# Patient Record
Sex: FEMALE | Race: WHITE | Employment: PART TIME | URBAN - METROPOLITAN AREA
[De-identification: names, ages, dates, MRNs, and addresses within clinical notes are randomized per-mention and may not be internally consistent; named-entity substitution may affect disease eponyms.]

---

## 2021-10-16 ENCOUNTER — HOSPITAL ENCOUNTER (EMERGENCY)
Age: 46
Discharge: HOME | End: 2021-10-16
Attending: STUDENT IN AN ORGANIZED HEALTH CARE EDUCATION/TRAINING PROGRAM
Payer: COMMERCIAL

## 2021-10-16 VITALS
TEMPERATURE: 98.8 F | RESPIRATION RATE: 16 BRPM | HEART RATE: 104 BPM | DIASTOLIC BLOOD PRESSURE: 86 MMHG | BODY MASS INDEX: 22.53 KG/M2 | OXYGEN SATURATION: 98 % | HEIGHT: 66 IN | SYSTOLIC BLOOD PRESSURE: 128 MMHG | WEIGHT: 140.21 LBS

## 2021-10-16 DIAGNOSIS — K04.7 DENTAL INFECTION: Primary | ICD-10-CM

## 2021-10-16 PROCEDURE — 74011250636 HC RX REV CODE- 250/636: Performed by: STUDENT IN AN ORGANIZED HEALTH CARE EDUCATION/TRAINING PROGRAM

## 2021-10-16 PROCEDURE — 75810000283 HC INJECTION NERVE BLOCK

## 2021-10-16 PROCEDURE — 74011000250 HC RX REV CODE- 250: Performed by: STUDENT IN AN ORGANIZED HEALTH CARE EDUCATION/TRAINING PROGRAM

## 2021-10-16 PROCEDURE — 99283 EMERGENCY DEPT VISIT LOW MDM: CPT

## 2021-10-16 PROCEDURE — 96372 THER/PROPH/DIAG INJ SC/IM: CPT

## 2021-10-16 PROCEDURE — 74011250637 HC RX REV CODE- 250/637: Performed by: STUDENT IN AN ORGANIZED HEALTH CARE EDUCATION/TRAINING PROGRAM

## 2021-10-16 RX ORDER — LIDOCAINE HYDROCHLORIDE 10 MG/ML
5 INJECTION INFILTRATION; PERINEURAL ONCE
Status: COMPLETED | OUTPATIENT
Start: 2021-10-16 | End: 2021-10-16

## 2021-10-16 RX ORDER — BUPIVACAINE HYDROCHLORIDE 5 MG/ML
1 INJECTION, SOLUTION EPIDURAL; INTRACAUDAL
Status: COMPLETED | OUTPATIENT
Start: 2021-10-16 | End: 2021-10-16

## 2021-10-16 RX ORDER — KETOROLAC TROMETHAMINE 30 MG/ML
30 INJECTION, SOLUTION INTRAMUSCULAR; INTRAVENOUS
Status: COMPLETED | OUTPATIENT
Start: 2021-10-16 | End: 2021-10-16

## 2021-10-16 RX ADMIN — KETOROLAC TROMETHAMINE 30 MG: 30 INJECTION, SOLUTION INTRAMUSCULAR; INTRAVENOUS at 16:17

## 2021-10-16 RX ADMIN — BUPIVACAINE HYDROCHLORIDE 5 MG: 5 INJECTION, SOLUTION EPIDURAL; INTRACAUDAL; PERINEURAL at 16:17

## 2021-10-16 RX ADMIN — LIDOCAINE HYDROCHLORIDE: 20 SOLUTION ORAL; TOPICAL at 16:17

## 2021-10-16 RX ADMIN — LIDOCAINE HYDROCHLORIDE 5 ML: 10 INJECTION, SOLUTION INFILTRATION; PERINEURAL at 16:17

## 2021-10-16 NOTE — ED PROVIDER NOTES
The patient is a 43-year-old female history of hepatitis C presenting today secondary to dental pain. She reports that she has had severe pain in the right lower molar region. She has a tooth that needs to be pulled and she has an appointment next week for this. She had been seen at the dental clinic a month ago and was told to take clindamycin but does started taking it today. She is taking over-the-counter pain medications. Patient is requesting a dental block just that she can get to the rest of her night. No trouble swallowing or breathing. No fevers. Past Medical History:   Diagnosis Date    Hepatitis C     IBS (irritable bowel syndrome)        Past Surgical History:   Procedure Laterality Date    HX  SECTION      HX CHOLECYSTECTOMY  2009         No family history on file. Social History     Socioeconomic History    Marital status:      Spouse name: Not on file    Number of children: Not on file    Years of education: Not on file    Highest education level: Not on file   Occupational History    Not on file   Tobacco Use    Smoking status: Not on file   Substance and Sexual Activity    Alcohol use: Not on file    Drug use: Not on file    Sexual activity: Not on file   Other Topics Concern    Not on file   Social History Narrative    Not on file     Social Determinants of Health     Financial Resource Strain:     Difficulty of Paying Living Expenses:    Food Insecurity:     Worried About Running Out of Food in the Last Year:     [address] in the Last Year:    Transportation Needs:     Lack of Transportation (Medical):      Lack of Transportation (Non-Medical):    Physical Activity:     Days of Exercise per Week:     Minutes of Exercise per Session:    Stress:     Feeling of Stress :    Social Connections:     Frequency of Communication with Friends and Family:     Frequency of Social Gatherings with Friends and Family:     Attends [employer] Services:     Active Member of Clubs or Organizations:     Attends Club or Organization Meetings:     Marital Status:    Intimate Partner Violence:     Fear of Current or Ex-Partner:     Emotionally Abused:     Physically Abused:     Sexually Abused: ALLERGIES: Augmentin [amoxicillin-pot clavulanate], Gluten, Milk containing products, and Potato starch    Review of Systems   Constitutional: Positive for fever. HENT: Positive for dental problem. Negative for trouble swallowing. Vitals:    10/16/21 1458   BP: 128/86   Pulse: (!) 104   Resp: 16   Temp: 98.8 °F (37.1 °C)   SpO2: 98%   Weight: 63.6 kg (140 lb 3.4 oz)   Height: 5' 5.5\" (1.664 m)            Physical Exam  Constitutional:       General: She is not in acute distress. Appearance: She is well-developed. HENT:      Head: Normocephalic. Mouth/Throat:      Comments: Overall poor dentition  Significant erosion to tooth #32 with mild erythema and inflammation adjacent to the tooth, no periapical abscess. Floor the mouth is soft. Uvula is midline. No trismus or muffled voice  Eyes:      Conjunctiva/sclera: Conjunctivae normal.   Pulmonary:      Effort: Pulmonary effort is normal. No respiratory distress. Musculoskeletal:         General: Normal range of motion. Cervical back: Normal range of motion. Skin:     General: Skin is warm and dry. Capillary Refill: Capillary refill takes less than 2 seconds. Psychiatric:         Behavior: Behavior normal.              Course:  Dental balls provided    Procedure Note - Dental Block:    Performed by Mercedes Fatima DO . Immediately prior to the procedure, the patient was reevaluated and found suitable for the planned procedure and any planned medications. Immediately prior to the procedure a time out was called to verify the correct patient, procedure, equipment, staff, and marking as appropriate.     To achieve control of dental pain, a supraperiosteal infiltration was performed on the right side. 1 mL of 0.5% bupivicaine and 1% lidocaine was injected without complication. The procedure was tolerated well. MDM: Patient is a 29-year-old female presenting today with a dental infection on the right side. She just started taking her clindamycin today. She is requesting a dental block which was performed above with improvement in symptoms. She was given dental balls in the emergency department. She does not want any other pain medications. She has follow-up scheduled next week for extraction of this tooth. Clinical Impression:     ICD-10-CM ICD-9-CM    1.  Dental infection  K04.7 522.4            Disposition: CARMEN Donald,

## 2021-10-16 NOTE — ED NOTES
Dr. Bryanna Coker and I have reviewed discharge instructions with the patient. The patient verbalized understanding.

## 2021-10-16 NOTE — ED TRIAGE NOTES
Pt. Complains right bottom tooth pain and is scheduled to have it pulled on October 26th . Pt.  Went 4 weeks ago for same and given clindamycin (1 dose)  that she just started taking as well as ibuprofen 800

## 2023-03-18 ENCOUNTER — HOSPITAL ENCOUNTER (EMERGENCY)
Age: 48
Discharge: HOME | End: 2023-03-18
Attending: EMERGENCY MEDICINE
Payer: COMMERCIAL

## 2023-03-18 VITALS
OXYGEN SATURATION: 99 % | RESPIRATION RATE: 16 BRPM | DIASTOLIC BLOOD PRESSURE: 77 MMHG | SYSTOLIC BLOOD PRESSURE: 128 MMHG | HEART RATE: 96 BPM

## 2023-03-18 DIAGNOSIS — R31.9 URINARY TRACT INFECTION WITH HEMATURIA, SITE UNSPECIFIED: Primary | ICD-10-CM

## 2023-03-18 DIAGNOSIS — N39.0 URINARY TRACT INFECTION WITH HEMATURIA, SITE UNSPECIFIED: Primary | ICD-10-CM

## 2023-03-18 LAB
APPEARANCE UR: CLEAR
BACTERIA URNS QL MICRO: ABNORMAL /HPF
BILIRUB UR QL: NEGATIVE
COLOR UR: ABNORMAL
EPITH CASTS URNS QL MICRO: ABNORMAL /LPF
GLUCOSE UR STRIP.AUTO-MCNC: 100 MG/DL
HGB UR QL STRIP: ABNORMAL
KETONES UR QL STRIP.AUTO: NEGATIVE MG/DL
LEUKOCYTE ESTERASE UR QL STRIP.AUTO: ABNORMAL
NITRITE UR QL STRIP.AUTO: POSITIVE
PH UR STRIP: 5.5 (ref 5–8)
PROT UR STRIP-MCNC: ABNORMAL MG/DL
RBC #/AREA URNS HPF: ABNORMAL /HPF (ref 0–5)
SP GR UR REFRACTOMETRY: 1.01 (ref 1–1.03)
UR CULT HOLD, URHOLD: NORMAL
UROBILINOGEN UR QL STRIP.AUTO: 1 EU/DL (ref 0.2–1)
WBC URNS QL MICRO: ABNORMAL /HPF (ref 0–4)

## 2023-03-18 PROCEDURE — 81001 URINALYSIS AUTO W/SCOPE: CPT

## 2023-03-18 PROCEDURE — 99283 EMERGENCY DEPT VISIT LOW MDM: CPT

## 2023-03-18 RX ORDER — CEPHALEXIN 500 MG/1
500 CAPSULE ORAL 4 TIMES DAILY
Qty: 28 CAPSULE | Refills: 0 | Status: SHIPPED | OUTPATIENT
Start: 2023-03-18 | End: 2023-03-25

## 2023-03-18 RX ORDER — ONDANSETRON 4 MG/1
4 TABLET, FILM COATED ORAL
Qty: 20 TABLET | Refills: 0 | Status: SHIPPED | OUTPATIENT
Start: 2023-03-18

## 2023-03-18 NOTE — DISCHARGE INSTRUCTIONS
As discussed, you have a UTI. You are being prescribed Keflex as an antibiotic and Zofran for nausea. You may continue to take Azo. You may take ibuprofen or Tylenol for pain or fever. Follow up with your PCP for further management. Return to the ER if you experience severe or worsening symptoms.

## 2023-03-18 NOTE — ED PROVIDER NOTES
Urinary Pain   Associated symptoms include frequency and urgency. Pertinent negatives include no chills, no nausea, no vomiting, no hematuria, no vaginal discharge and no abdominal pain. Liudmila Colon is a 41 y.o. female with Hx of hepatitis C, IBS, pyelonephritis who presents ambulatory to [facility] ED with cc of dysuria for approximately 1 week. Patient reports dysuria, urinary frequency, hesitancy, lower abdominal pain for approximately 1 week. Was seen at urgent care right when symptoms started and told UA was clear. She has been taking Azo with some relief. Endorses nausea, but is unsure if this was related to her migraines which are common at time of menstruation for her. Denies fever, chills, vomiting, hematuria, chance of pregnancy, other abdominal pain, menstrual issues, vaginal discharge, any other symptoms. PCP: Yamil, Not On File, DDS    There are no other complaints, changes or physical findings at this time. Past Medical History:   Diagnosis Date    Hepatitis C     IBS (irritable bowel syndrome)     Kidney infection        Past Surgical History:   Procedure Laterality Date    HX  SECTION      HX CHOLECYSTECTOMY  2009         History reviewed. No pertinent family history.     Social History     Socioeconomic History    Marital status:      Spouse name: Not on file    Number of children: Not on file    Years of education: Not on file    Highest education level: Not on file   Occupational History    Not on file   Tobacco Use    Smoking status: Not on file    Smokeless tobacco: Not on file   Substance and Sexual Activity    Alcohol use: Not on file    Drug use: Not on file    Sexual activity: Not on file   Other Topics Concern    Not on file   Social History Narrative    Not on file     Social Determinants of Health     Financial Resource Strain: Not on file   Food Insecurity: Not on file   Transportation Needs: Not on file   Physical Activity: Not on file   Stress: Not on file   Social Connections: Not on file   Intimate Partner Violence: Not on file   Housing Stability: Not on file         ALLERGIES: Augmentin [amoxicillin-pot clavulanate], Gluten, Milk containing products, and Potato starch    Review of Systems   Constitutional:  Negative for activity change, appetite change, chills and fever. HENT:  Negative for congestion, rhinorrhea and sore throat. Respiratory:  Negative for cough and shortness of breath. Cardiovascular:  Negative for chest pain. Gastrointestinal:  Negative for abdominal pain, nausea and vomiting. Genitourinary:  Positive for dysuria, frequency, pelvic pain and urgency. Negative for decreased urine volume, difficulty urinating, hematuria, menstrual problem, vaginal discharge and vaginal pain. Musculoskeletal:  Negative for arthralgias and myalgias. Skin:  Negative for color change and rash. Neurological:  Negative for light-headedness and headaches. Psychiatric/Behavioral:  Negative for agitation and confusion. The patient is not nervous/anxious. Vitals:    03/18/23 1710   BP: 128/77   Pulse: 96   Resp: 16   SpO2: 99%            Physical Exam  Vitals and nursing note reviewed. Constitutional:       Appearance: Normal appearance. HENT:      Head: Normocephalic and atraumatic. Right Ear: External ear normal.      Left Ear: External ear normal.      Nose: Nose normal.      Mouth/Throat:      Mouth: Mucous membranes are moist.   Eyes:      Extraocular Movements: Extraocular movements intact. Conjunctiva/sclera: Conjunctivae normal.      Pupils: Pupils are equal, round, and reactive to light. Cardiovascular:      Rate and Rhythm: Normal rate and regular rhythm. Pulses: Normal pulses. Heart sounds: Normal heart sounds. Pulmonary:      Effort: Pulmonary effort is normal.      Breath sounds: Normal breath sounds. Abdominal:      Palpations: Abdomen is soft. Tenderness:  There is abdominal tenderness (Suprapubic). There is no guarding or rebound. Musculoskeletal:         General: Normal range of motion. Cervical back: Normal range of motion and neck supple. Skin:     General: Skin is warm and dry. Capillary Refill: Capillary refill takes less than 2 seconds. Neurological:      General: No focal deficit present. Mental Status: She is alert and oriented to person, place, and time. Mental status is at baseline. Psychiatric:         Mood and Affect: Mood normal.         Behavior: Behavior normal.         Thought Content: Thought content normal.         Judgment: Judgment normal.        Medical Decision Making  Ddx: UTI, pyelonephritis, nephrolithiasis, and others    46-year-old female presents with 1 week of dysuria, frequency, hesitancy, lower abdominal pain. UA appears infected. Vital signs stable, afebrile, no significant abdominal tenderness or back pain; no indication for bloodwork or imaging at this time. Discussed with patient this is most likely a UTI. Discharged with Keflex, Zofran, PCP follow-up, strict return precautions. .    Amount and/or Complexity of Data Reviewed  Labs: ordered. Risk  Prescription drug management.            Procedures    LABORATORY TESTS:  Recent Results (from the past 12 hour(s))   URINALYSIS W/MICROSCOPIC    Collection Time: 03/18/23  5:26 PM   Result Value Ref Range    Color DARK YELLOW      Appearance CLEAR CLEAR      Specific gravity 1.010 1.003 - 1.030      pH (UA) 5.5 5.0 - 8.0      Protein TRACE (A) NEG mg/dL    Glucose 100 (A) NEG mg/dL    Ketone Negative NEG mg/dL    Bilirubin Negative NEG      Blood LARGE (A) NEG      Urobilinogen 1.0 0.2 - 1.0 EU/dL    Nitrites Positive (A) NEG      Leukocyte Esterase MODERATE (A) NEG      WBC 10-20 0 - 4 /hpf    RBC 0-5 0 - 5 /hpf    Epithelial cells FEW FEW /lpf    Bacteria 1+ (A) NEG /hpf   URINE CULTURE HOLD SAMPLE    Collection Time: 03/18/23  5:26 PM    Specimen: Urine   Result Value Ref Range    Urine culture hold        Urine on hold in Microbiology dept for 2 days. If unpreserved urine is submitted, it cannot be used for addtional testing after 24 hours, recollection will be required. IMAGING RESULTS:  No orders to display       MEDICATIONS GIVEN:  Medications - No data to display    IMPRESSION:  1. Urinary tract infection with hematuria, site unspecified        PLAN:  1. Discharge Medication List as of 3/18/2023  6:11 PM        START taking these medications    Details   cephALEXin (Keflex) 500 mg capsule Take 1 Capsule by mouth four (4) times daily for 7 days. , Normal, Disp-28 Capsule, R-0      ondansetron hcl (Zofran) 4 mg tablet Take 1 Tablet by mouth every eight (8) hours as needed for Nausea or Vomiting., Normal, Disp-20 Tablet, R-0           CONTINUE these medications which have NOT CHANGED    Details   azithromycin (ZITHROMAX) 250 mg tablet z packPrint, Disp-6 Tab, R-0      codeine-guaiFENesin (ROBITUSSIN-AC)  mg/5 mL syrup mg dosing is based on codeine component. Take 10 mL by mouth three (3) times daily as needed for Cough. Print, 10 mL, Disp-120 mL, R-0           2. Follow-up Information       Follow up With Specialties Details Why Contact Info    [facility] [address] [facility] [place], [place] Emergency Medicine Go to  As needed, If symptoms worsen [address]    Your PCP  Schedule an appointment as soon as possible for a visit             3. Return to ED if worse     Presentation, management, and disposition were discussed with the attending physician, Dr. Nadyne Angelucci, who is in agreement with plan of care.

## 2023-03-21 LAB
BACTERIA SPEC CULT: ABNORMAL
CC UR VC: ABNORMAL
SERVICE CMNT-IMP: ABNORMAL

## 2023-05-11 RX ORDER — ONDANSETRON 4 MG/1
TABLET, FILM COATED ORAL EVERY 8 HOURS PRN
COMMUNITY
Start: 2023-03-18

## 2023-05-11 RX ORDER — AZITHROMYCIN 250 MG/1
TABLET, FILM COATED ORAL
COMMUNITY
Start: 2013-02-11

## 2023-05-11 RX ORDER — GUAIFENESIN AND CODEINE PHOSPHATE 100; 10 MG/5ML; MG/5ML
SOLUTION ORAL 3 TIMES DAILY PRN
COMMUNITY
Start: 2013-02-11

## 2024-12-04 ENCOUNTER — HOSPITAL ENCOUNTER (EMERGENCY)
Facility: HOSPITAL | Age: 49
Discharge: HOME | End: 2024-12-04
Attending: STUDENT IN AN ORGANIZED HEALTH CARE EDUCATION/TRAINING PROGRAM
Payer: MEDICAID

## 2024-12-04 VITALS
RESPIRATION RATE: 18 BRPM | TEMPERATURE: 97.8 F | DIASTOLIC BLOOD PRESSURE: 85 MMHG | WEIGHT: 105.16 LBS | HEART RATE: 74 BPM | SYSTOLIC BLOOD PRESSURE: 137 MMHG | HEIGHT: 66 IN | BODY MASS INDEX: 16.9 KG/M2 | OXYGEN SATURATION: 100 %

## 2024-12-04 DIAGNOSIS — S06.0X0A CONCUSSION WITHOUT LOSS OF CONSCIOUSNESS, INITIAL ENCOUNTER: Primary | ICD-10-CM

## 2024-12-04 DIAGNOSIS — S00.83XA TRAUMATIC HEMATOMA OF FOREHEAD, INITIAL ENCOUNTER: ICD-10-CM

## 2024-12-04 PROCEDURE — 6360000002 HC RX W HCPCS: Performed by: PHYSICIAN ASSISTANT

## 2024-12-04 PROCEDURE — 99284 EMERGENCY DEPT VISIT MOD MDM: CPT

## 2024-12-04 PROCEDURE — 96372 THER/PROPH/DIAG INJ SC/IM: CPT

## 2024-12-04 PROCEDURE — 6370000000 HC RX 637 (ALT 250 FOR IP): Performed by: PHYSICIAN ASSISTANT

## 2024-12-04 RX ORDER — ONDANSETRON 4 MG/1
4 TABLET, ORALLY DISINTEGRATING ORAL
Status: COMPLETED | OUTPATIENT
Start: 2024-12-04 | End: 2024-12-04

## 2024-12-04 RX ORDER — KETOROLAC TROMETHAMINE 10 MG/1
10 TABLET, FILM COATED ORAL EVERY 6 HOURS PRN
Qty: 20 TABLET | Refills: 0 | Status: SHIPPED | OUTPATIENT
Start: 2024-12-04

## 2024-12-04 RX ORDER — ONDANSETRON 4 MG/1
4 TABLET, FILM COATED ORAL 3 TIMES DAILY PRN
Qty: 15 TABLET | Refills: 0 | Status: SHIPPED | OUTPATIENT
Start: 2024-12-04

## 2024-12-04 RX ORDER — KETOROLAC TROMETHAMINE 30 MG/ML
30 INJECTION, SOLUTION INTRAMUSCULAR; INTRAVENOUS
Status: COMPLETED | OUTPATIENT
Start: 2024-12-04 | End: 2024-12-04

## 2024-12-04 RX ORDER — CITALOPRAM 30 MG/1
CAPSULE ORAL
COMMUNITY

## 2024-12-04 RX ORDER — BUPRENORPHINE AND NALOXONE 8; 2 MG/1; MG/1
1 FILM, SOLUBLE BUCCAL; SUBLINGUAL 2 TIMES DAILY
COMMUNITY

## 2024-12-04 RX ADMIN — KETOROLAC TROMETHAMINE 30 MG: 30 INJECTION, SOLUTION INTRAMUSCULAR at 12:41

## 2024-12-04 RX ADMIN — ONDANSETRON 4 MG: 4 TABLET, ORALLY DISINTEGRATING ORAL at 12:41

## 2024-12-04 ASSESSMENT — PAIN DESCRIPTION - LOCATION: LOCATION: HEAD

## 2024-12-04 ASSESSMENT — PAIN DESCRIPTION - ORIENTATION: ORIENTATION: RIGHT

## 2024-12-04 ASSESSMENT — PAIN SCALES - GENERAL
PAINLEVEL_OUTOF10: 2
PAINLEVEL_OUTOF10: 2

## 2024-12-04 ASSESSMENT — PAIN - FUNCTIONAL ASSESSMENT: PAIN_FUNCTIONAL_ASSESSMENT: 0-10

## 2024-12-04 ASSESSMENT — ENCOUNTER SYMPTOMS: NAUSEA: 1

## 2024-12-04 NOTE — DISCHARGE INSTRUCTIONS
Call your primary care provider within the next 24 hours for close outpatient follow-up.  Continue symptomatic relief with anti-inflammatory such as Toradol and antinausea medication Zofran as needed, as prescribed.  Do not take other anti-inflammatory such as naproxen, Aleve, Advil, ibuprofen, while you are taking Toradol.  If other inflammatories work better you can stop Toradol.  Only a short course of 5 to 7 days for inflammation.  If at any point you should notice worsening symptoms, change in speech, one-sided weakness, loss of function, any new or worsening problems, return to emergency department immediately.  Thank you for allowing us to be a part of your care.

## 2024-12-04 NOTE — ED PROVIDER NOTES
[facility] EM[facility]  EMERGENCY DEPARTMENT ENCOUNTER      Pt Name: Micaela Peña  MRN: [id number]  Birthdate 1975  Date of evaluation: 2024  Provider: Papi Ferreira PA-C    CHIEF COMPLAINT       Chief Complaint   Patient presents with    Head Injury         HISTORY OF PRESENT ILLNESS   (Location/Symptom, Timing/Onset, Context/Setting, Quality, Duration, Modifying Factors, Severity)  Note limiting factors.   48 yo F w/ history of migraine headaches presenting to ED for head trauma that occurred 1 hour prior to arrival.  Patient reports that a metal door swung open and hit her in the right side of her forehead at full force.  No loss of consciousness.  Denies blood thinners, vomiting.  Endorses right-sided head pain, headache, forehead hematoma, nausea, fatigue.  Has not taken any medications for symptoms.          Review of External Medical Records:     Nursing Notes were reviewed.    REVIEW OF SYSTEMS    (2-9 systems for level 4, 10 or more for level 5)     Review of Systems   Constitutional:  Positive for fatigue.   Gastrointestinal:  Positive for nausea.   Neurological:  Positive for headaches.   All other systems reviewed and are negative.      Except as noted above the remainder of the review of systems was reviewed and negative.       PAST MEDICAL HISTORY     Past Medical History:   Diagnosis Date    Fibromyalgia     Hepatitis C     pt reports treated and cleared    IBS (irritable bowel syndrome)     Kidney infection          SURGICAL HISTORY       Past Surgical History:   Procedure Laterality Date     SECTION      CHOLECYSTECTOMY           CURRENT MEDICATIONS       Previous Medications    ATOMOXETINE HCL (STRATTERA PO)    Take by mouth    BACLOFEN PO    Take by mouth    BUPRENORPHINE-NALOXONE (SUBOXONE) 8-2 MG FILM SL FILM    Place 1 Film under the tongue in the morning and at bedtime. Max Daily Amount: 2 Film    CITALOPRAM HYDROBROMIDE 30 MG CAPS    Take by mouth    GABAPENTIN PO     focal deficit present.      Mental Status: She is alert and oriented to person, place, and time.      Cranial Nerves: Cranial nerves 2-12 are intact.      Sensory: Sensation is intact.      Motor: Motor function is intact.      Coordination: Coordination is intact.      Gait: Gait is intact.   Psychiatric:         Behavior: Behavior normal.         DIAGNOSTIC RESULTS     EKG: All EKG's are interpreted by the Emergency Department Physician who either signs or Co-signs this chart in the absence of a cardiologist.        RADIOLOGY:   Non-plain film images such as CT, Ultrasound and MRI are read by the radiologist. Plain radiographic images are visualized and preliminarily interpreted by the emergency physician with the below findings:        Interpretation per the Radiologist below, if available at the time of this note:    No orders to display        LABS:  Labs Reviewed - No data to display    All other labs were within normal range or not returned as of this dictation.    EMERGENCY DEPARTMENT COURSE and DIFFERENTIAL DIAGNOSIS/MDM:   Vitals:    Vitals:    12/04/24 1209   BP: (!) 142/77   Pulse: 74   Resp: 18   Temp: 97.8 °F (36.6 °C)   TempSrc: Tympanic   SpO2: 100%   Weight: 47.7 kg (105 lb 2.6 oz)   Height: 1.676 m (5' 6\")           Medical Decision Making  43-year-old F presenting to emergency department for head trauma from metal door that swung open and hit her in the head resulting in hematoma of right side of forehead tender to palpation.  No deformities.  No depressions.  No signs or symptoms to indicate basilar skull fracture.  Patient is without tenderness of nose or face.  Converse CT head rule deems CT head for trauma unnecessary.  Will continue to monitor patient and repeat neuroexams prior to discharge.  P.o. Zofran, IM Toradol.  Repeat neuro examination unchanged.  Normal neuro exam.  Close FU with PCP.  Return precautions given to and understood by patient. Clinically stable pt, NAD, VSS, d/c home.

## 2024-12-04 NOTE — ED TRIAGE NOTES
Patient presents ambulatory to triage with steady gait. Pt reports a \"metal door hit me in the head at full force while at work and they sent me in a Uber here to get checked out\". Denies falling. Denies LOC. Denies blood thinners. Pt has raised egg size area to RIGHT forehead. +Nausea, denies vomiting. +fatigue. Denies vision changes. Pt localizes pain to forehead only. Denies any medications PTA.

## 2024-12-07 ENCOUNTER — HOSPITAL ENCOUNTER (EMERGENCY)
Facility: HOSPITAL | Age: 49
Discharge: HOME | End: 2024-12-07
Attending: STUDENT IN AN ORGANIZED HEALTH CARE EDUCATION/TRAINING PROGRAM
Payer: COMMERCIAL

## 2024-12-07 VITALS
WEIGHT: 105 LBS | HEIGHT: 66 IN | HEART RATE: 80 BPM | DIASTOLIC BLOOD PRESSURE: 76 MMHG | RESPIRATION RATE: 16 BRPM | TEMPERATURE: 97.5 F | OXYGEN SATURATION: 98 % | SYSTOLIC BLOOD PRESSURE: 124 MMHG | BODY MASS INDEX: 16.88 KG/M2

## 2024-12-07 DIAGNOSIS — F07.81 POST CONCUSSION SYNDROME: Primary | ICD-10-CM

## 2024-12-07 PROCEDURE — 99282 EMERGENCY DEPT VISIT SF MDM: CPT

## 2024-12-07 ASSESSMENT — ENCOUNTER SYMPTOMS: NAUSEA: 1

## 2024-12-07 ASSESSMENT — PAIN DESCRIPTION - LOCATION: LOCATION: HEAD

## 2024-12-07 ASSESSMENT — PAIN DESCRIPTION - ORIENTATION: ORIENTATION: LEFT

## 2024-12-07 ASSESSMENT — PAIN SCALES - GENERAL: PAINLEVEL_OUTOF10: 2

## 2024-12-07 ASSESSMENT — PAIN DESCRIPTION - DESCRIPTORS: DESCRIPTORS: DISCOMFORT

## 2024-12-07 NOTE — ED PROVIDER NOTES
[facility]  EMERGENCY DEPARTMENT ENCOUNTER      Pt Name: Micaela Peña  MRN: [id number]  Birthdate 1975  Date of evaluation: 2024  Provider: Jose Franklin DO    CHIEF COMPLAINT       Chief Complaint   Patient presents with    Head Injury    Headache    Nausea         HISTORY OF PRESENT ILLNESS   (Location/Symptom, Timing/Onset, Context/Setting, Quality, Duration, Modifying Factors, Severity)  Note limiting factors.   Patient is a 49-year-old female who is otherwise healthy presenting today for reevaluation after head injury that occurred 4 days ago.  She was initially seen here and diagnosed with concussion and sent home with nausea medications.  The initial injury was a door striking her head, no fall.  She reports that since then she has had sleepiness, mild headaches, light and sound sensitivity, nausea and dizziness.  She feels like she is in a fog.  She went to work today and they told her to come get reevaluated.  Right now mild headache.  Denies focal weakness or numbness.            Review of External Medical Records:     Nursing Notes were reviewed.    REVIEW OF SYSTEMS    (2-9 systems for level 4, 10 or more for level 5)     Review of Systems   Gastrointestinal:  Positive for nausea.   Neurological:  Positive for dizziness and headaches.       Except as noted above the remainder of the review of systems was reviewed and negative.       PAST MEDICAL HISTORY     Past Medical History:   Diagnosis Date    Fibromyalgia     Hepatitis C     pt reports treated and cleared    IBS (irritable bowel syndrome)     Kidney infection          SURGICAL HISTORY       Past Surgical History:   Procedure Laterality Date     SECTION      CHOLECYSTECTOMY           CURRENT MEDICATIONS       Discharge Medication List as of 2024 12:17 PM        CONTINUE these medications which have NOT CHANGED    Details   GABAPENTIN PO Take by mouthHistorical Med      Citalopram Hydrobromide 30 MG CAPS    Cardiovascular:      Rate and Rhythm: Normal rate and regular rhythm.      Pulses: Normal pulses.      Heart sounds: Normal heart sounds. No murmur heard.     Comments: Equal radial, dp, pt pulses  Pulmonary:      Effort: Pulmonary effort is normal. No respiratory distress.      Breath sounds: Normal breath sounds.   Abdominal:      General: There is no distension.      Palpations: Abdomen is soft.      Tenderness: There is no abdominal tenderness.   Musculoskeletal:         General: Normal range of motion.      Cervical back: Normal range of motion and neck supple.      Right lower leg: No edema.      Left lower leg: No edema.   Skin:     General: Skin is warm and dry.      Capillary Refill: Capillary refill takes less than 2 seconds.   Neurological:      General: No focal deficit present.      Mental Status: She is alert and oriented to person, place, and time.      Cranial Nerves: No cranial nerve deficit.      Sensory: No sensory deficit.      Motor: No weakness.      Coordination: Coordination normal.      Gait: Gait normal.   Psychiatric:         Mood and Affect: Mood normal.         Behavior: Behavior normal.         DIAGNOSTIC RESULTS     EKG: All EKG's are interpreted by the Emergency Department Physician who either signs or Co-signs this chart in the absence of a cardiologist.        RADIOLOGY:   Interpretation per the Radiologist below, if available at the time of this note:    No orders to display        LABS:  Labs Reviewed - No data to display    All other labs were within normal range or not returned as of this dictation.          COURSE/REASSESSMENT            CONSULTS:  None    PROCEDURES:  Unless otherwise noted below, none     Procedures      DIFFERENTIAL DIAGNOSIS/MDM:   Medical Decision Making  Is a well-appearing 49-year-old female presenting today with suspected postconcussive syndrome.  She had an head injury several days ago.  She really has minimal headaches but reports that she has had

## 2024-12-07 NOTE — ED TRIAGE NOTES
Patient arrives with c/o left sided headache (front to back), nausea and sleepy. Patient was seen here on Thursday for a head injury with a door to the face at work. Patient denies LOC, or blood thinners. Patient's worse sent her here to get re-evaluated because the symptoms persist.

## 2024-12-12 ENCOUNTER — APPOINTMENT (OUTPATIENT)
Facility: HOSPITAL | Age: 49
End: 2024-12-12
Payer: MEDICAID

## 2024-12-12 ENCOUNTER — HOSPITAL ENCOUNTER (EMERGENCY)
Facility: HOSPITAL | Age: 49
Discharge: HOME | End: 2024-12-12
Attending: EMERGENCY MEDICINE
Payer: MEDICAID

## 2024-12-12 VITALS
HEART RATE: 93 BPM | RESPIRATION RATE: 18 BRPM | DIASTOLIC BLOOD PRESSURE: 86 MMHG | OXYGEN SATURATION: 98 % | SYSTOLIC BLOOD PRESSURE: 140 MMHG | TEMPERATURE: 98.5 F

## 2024-12-12 DIAGNOSIS — F07.81 POST CONCUSSION SYNDROME: Primary | ICD-10-CM

## 2024-12-12 PROCEDURE — 99284 EMERGENCY DEPT VISIT MOD MDM: CPT

## 2024-12-12 PROCEDURE — 70450 CT HEAD/BRAIN W/O DYE: CPT

## 2024-12-12 ASSESSMENT — ENCOUNTER SYMPTOMS: NAUSEA: 1

## 2024-12-13 NOTE — ED PROVIDER NOTES
[facility] EMERGENCY DEP  EMERGENCY DEPARTMENT ENCOUNTER      Pt Name: Micaela Peña  MRN: [id number]  Birthdate 1975  Date of evaluation: 2024  Provider: Fabian Pugh PA-C    CHIEF COMPLAINT       Chief Complaint   Patient presents with    Concussion         HISTORY OF PRESENT ILLNESS   (Location/Symptom, Timing/Onset, Context/Setting, Quality, Duration, Modifying Factors, Severity)  Note limiting factors.   HPI  49-year-old female presenting to emergency department with chief complaint of head injury.  Reports that on  she had a head injury after a door slammed her in the face.  Since then was diagnosed with a concussion and has had headache, nausea, \"brain fog\", light/noise sensitivity.  Reports that the headache has gotten better though today it has come back.  She says it is mild and rates it at a 2 out of 10 in severity.  She attempted to follow-up with the concussion clinic though reports they would not see her without an active order for a head CT.  Denies active nausea.  Reports dizziness/lightheadedness, most notable upon standing up.  Denies blurred vision.  Denies weakness in arms or legs, difficulty ambulating.    Review of External Medical Records:     Nursing Notes were reviewed.    REVIEW OF SYSTEMS    (2-9 systems for level 4, 10 or more for level 5)     Review of Systems   Constitutional:         Light sensitivity, noise sensitivity.   Gastrointestinal:  Positive for nausea.   Neurological:  Positive for dizziness, light-headedness and headaches.       Except as noted above the remainder of the review of systems was reviewed and negative.       PAST MEDICAL HISTORY     Past Medical History:   Diagnosis Date    Fibromyalgia     Hepatitis C     pt reports treated and cleared    IBS (irritable bowel syndrome)     Kidney infection          SURGICAL HISTORY       Past Surgical History:   Procedure Laterality Date     SECTION      CHOLECYSTECTOMY           CURRENT  intact.      Pupils: Pupils are equal, round, and reactive to light.   Cardiovascular:      Rate and Rhythm: Normal rate.   Pulmonary:      Effort: Pulmonary effort is normal.   Musculoskeletal:         General: Normal range of motion.      Cervical back: Normal range of motion.   Skin:     General: Skin is warm and dry.   Neurological:      General: No focal deficit present.      Mental Status: She is alert and oriented to person, place, and time.      Cranial Nerves: No cranial nerve deficit.      Motor: No weakness.      Comments: Cranial nerves II, III, IV, VI without acute deficit   Psychiatric:         Mood and Affect: Mood normal.         Behavior: Behavior normal.         DIAGNOSTIC RESULTS     EKG: All EKG's are interpreted by the Emergency Department Physician who either signs or Co-signs this chart in the absence of a cardiologist.        RADIOLOGY:   Non-plain film images such as CT, Ultrasound and MRI are read by the radiologist. Plain radiographic images are visualized and preliminarily interpreted by the emergency physician with the below findings:        Interpretation per the Radiologist below, if available at the time of this note:    CT HEAD WO CONTRAST   Final Result      No evidence for acute intracranial abnormality               Electronically signed by Burt Beck           LABS:  Labs Reviewed - No data to display    All other labs were within normal range or not returned as of this dictation.    EMERGENCY DEPARTMENT COURSE and DIFFERENTIAL DIAGNOSIS/MDM:   Vitals:    Vitals:    12/12/24 1945   BP: (!) 140/86   Pulse: 93   Resp: 18   Temp: 98.5 °F (36.9 °C)   TempSrc: Oral   SpO2: 98%           Medical Decision Making  This is a 49-year-old female that is presenting to the emergency department with a chief complaint of persistent concussive like symptoms following a head injury earlier this month.  Seen in the emergency department 2 times following this injury.  No imaging performed at

## 2024-12-13 NOTE — DISCHARGE INSTRUCTIONS
Mild TBI (traumatic brain injury) and concussions are brain injuries. A mild TBI or concussion is caused by a bump, blow, or jolt to the head or body that causes: the head and brain to move quickly back and forth and or the brain to bounce or twist  in the skull from this sudden movement. Chemical changes in the brain and sometimes stretching and damage to the brain cells can cause injury. Medical providers may describe these injuries as “mild” brain injuries because they are usually not life-threatening. Even so, their effects can be serious. Mild TBI and concussion signs and symptoms are part of the normal healing process. Some mild TBI and concussion symptoms may appear right away, while other symptoms may not appear for hours or days after the injury. Symptoms generally improve over time, and most people will feel better within a couple of weeks. If you have symptoms that concern you or are getting worse, be sure to talk with your primary care doctor.      There is a local resource for concussion rehabilitation services with [facility], please call, [phone] to set up an appointment, if you feel that is needed.

## 2024-12-13 NOTE — ED TRIAGE NOTES
[facility]. She was seen on the 4th for a head injury and dc with dx of concussion. Seen again on the 7th for continued headache and nausea. She was referred to concussion clinic but they wouldn't see her due them claiming to not have an order.     Concerned about continued nausea and brain fog.